# Patient Record
(demographics unavailable — no encounter records)

---

## 2025-05-05 NOTE — PHYSICAL EXAM
[No Acute Distress] : no acute distress [Normal Oropharynx] : normal oropharynx [Normal Appearance] : normal appearance [No Neck Mass] : no neck mass [Normal Rate/Rhythm] : normal rate/rhythm [Murmur ___ / 6] : murmur [unfilled] / 6 [Normal S1, S2] : normal s1, s2 [No Resp Distress] : no resp distress [No Acc Muscle Use] : no acc muscle use [Normal Palpation] : normal palpation [Normal Rhythm and Effort] : normal rhythm and effort [Clear to Auscultation Bilaterally] : clear to auscultation bilaterally [Normal to Percussion] : normal to percussion [No Abnormalities] : no abnormalities [Benign] : benign [Normal Gait] : normal gait [No Clubbing] : no clubbing [No Cyanosis] : no cyanosis [No Edema] : no edema [FROM] : FROM [Normal Color/ Pigmentation] : normal color/ pigmentation [No Focal Deficits] : no focal deficits [Oriented x3] : oriented x3 [Normal Affect] : normal affect

## 2025-05-05 NOTE — CONSULT LETTER
[Dear  ___] : Dear  [unfilled], [Consult Letter:] : I had the pleasure of evaluating your patient, [unfilled]. [Please see my note below.] : Please see my note below. [Consult Closing:] : Thank you very much for allowing me to participate in the care of this patient.  If you have any questions, please do not hesitate to contact me. [Sincerely,] : Sincerely, [FreeTextEntry3] : Arnulfo Cooper D.O., SHANIQUE.  of Medicine [facility]

## 2025-05-05 NOTE — DISCUSSION/SUMMARY
[FreeTextEntry1] : 1.5 cm groundglass opacity right upper lobe High clinical suspicion for adenocarcinoma spectrum disease adenocarcinoma in situ/versus adenocarcinoma minimally invasive High risk patient former smoker Emphysema noted on imaging Pulmonary physiology demonstrates severe gas exchange impairment consistent with the diagnosis of the emphysema Past medical history reported Mitral valve prolapse Hypertension Hyperlipidemia Anxiety  Recommendation I had a long discussion with both patient and  at today's visit Based on her age risk factor profile abnormal pulmonary physiology she is an extremely high risk patient for a surgical procedure including a VATS resection Based on the fact we do not have any indication or timeframe regarding when this lesion may have first been present although she did have a relatively unremarkable CT chest dated back to 2014 I recommended continued observation Will repeat a CT chest in 3 months Then may consideration if there is change for a PET scan Patient will undergo serial observation over time Discussed in detail pathophysiology of groundglass opacities without a solid component

## 2025-05-05 NOTE — END OF VISIT
[facility] [place] [facility] [address] [place] [address]  [phone]      Sumi Roque :1992 MRN:[id number]    2021 Time Session Began: 11:01  Time Session Ended: 11:47    Due to COVID-19 precautions, this visit was performed via live interactive two-way video with patient's verbal consent.   Clinician Location:Home.  Patient Location: Car (parked).  Verified patient identity:  [x] Yes    Session Type:45 Minute Therapy (81412)    Others Present: n/a    Intervention: Behavioral, Cognitive, Insight, Supportive, Systemic    Suicide/Homicide/Violence Ideation: No    If Yes, explain: n/a    Current Outpatient Medications   Medication Sig   • diclofenac (VOLTAREN) 1 % gel APPLY 2 GRAMS TOPICALLY THREE TIMES DAILY   • clonazePAM (KlonoPIN) 0.5 MG tablet Take 0.5 tablets by mouth 3 times daily. TO BE TAKEN CONSISTENTLY   • mirtazapine (REMERON) 7.5 MG tablet 1 to 1.5 tabs at bedtime   • propranolol (INDERAL) 20 MG tablet Take 1 tablet by mouth 3 times daily.   • famotidine (Pepcid) 20 MG tablet Take 1 tablet by mouth daily.   • Magnesium 400 MG Tab Take 400 mg by mouth 2 times daily. Takes 2 tablets   • vitamin D, Cholecalciferol, 25 mcg (1,000 units) capsule Take 25 mcg by mouth nightly.   • Omega-3 Fatty Acids (OMEGA-3 FISH OIL PO) Take 500 mg by mouth daily.   • acetaminophen (TYLENOL) 500 MG tablet Take 1,000 mg by mouth every 6 hours as needed for Pain.   • levonorgestrel-ethinyl estradiol (SEASONALE) 0.15-0.03 MG per tablet TK 1 T PO  D     No current facility-administered medications for this visit.       Change in Medication(s) Reported: Yes  If Yes, explain: see chart    Patient/Family Education Provided: Yes  Patient/Family Displays Understanding: Yes    If No, explain: n/a    Chief complaint in patient's own words: \"Things have been worse.\"    Progress Note containing chief complaint and symptoms/problems related to the  complaint:    (Data/Action/Response/Plan)    D: Patient discussed issues and events since last appointment. Reports on multiple difficulties with medication, side effects, and upcoming medical evaluations. Processed physical symptoms, heightened anxiety, and struggles to cope. Patient discussed work-related issues and stressors and perspective on current job. Processed communication difficulties with others, struggles to set limits, and feeling overwhelmed by recent circumstances. Discussed family issues, relationship dynamics, and limited support system.  A: Therapist offered empathic listening for patient concerns and experiences. Patient cognitions were acknowledged and reflected to encourage insight. Explored anxiety triggers, stress management, and perspective. Processed strategies for reshaping negative cognitions, exploring opportunities, and seeking potential in situations Reflected and reframed patient concerns.  Encouraged self-care and efforts toward daily relaxation.   R: Patient was open and talkative, and was engaged in the therapeutic process. Patient was alert and oriented x4. Mood was anxious and affect congruent. Eye contact was maintained. Patient was calm and cooperative with this provider. Speech was normal in rate, volume, and tone. Motor activity was unremarkable. Recent and remote memory was intact. There was no evidence that patient was responding to internal stimuli. Thought process was future oriented and goal directed.  P: Follow up session scheduled for July 22, 2021 at 12:00.    Need for Community Resources Assessed: Yes    Resources Needed: No    If Yes, what resources: n/a    Primary Diagnosis: Generalized Anxiety Disorder  : N/A    Treatment Plan: Unchanged    Discharge Plan: N/A    Next Appointment: July 22, 2021 at 12:00  Amarilis Bledsoe LPC   [Time Spent: ___ minutes] : I have spent [unfilled] minutes of time on the encounter which excludes teaching and separately reported services.

## 2025-05-05 NOTE — HISTORY OF PRESENT ILLNESS
[>= 20 pack years] : >= 20 pack years [TextBox_4] : 89-year-old female Abnormal CT chest Groundglass opacity identified right upper lobe No reported solid component They reported this as suspicious because there was no evidence of an abnormality dating back to scan of 2014 Patient does have a greater than 20-year pack history tobacco She states she tested off tobacco sometime in the 1990s She has no other chemical toxic inhalational exposures No respiratory symptoms No cough No wheeze No significant shortness of breath Additional findings on the CT scan of the chest reported emphysema  Past medical history Mitral valve prolapse History hypertension History hyperlipidemia Both hypertension hyperlipidemia previously noted she been on medications she states currently she is not taking active medications for these diagnoses as noted below Anxiety and she is on 50 mg of Zoloft  Chart review Status post emergency department evaluation [facility] March 28, 2025 88-year-old female Complaint of chest pressure States in the review Emergency Department note occasional chest discomfort she described more as heartburn She felt it occurs taking Zoloft Some associated general weakness some nausea She has been working with psychiatry weaning off of her Zoloft Did not report abdominal pain Does have anxiety She complains of having some shortness of breath for 1 to 2 days without exertional dyspnea They reported no leg swelling Reportedly patient had a visit in the emergency department for chest pain in December facility similar symptomatology and is under the care of Dr. Macedo for cardiology with plans for a stress test  Medications Xanax 0.25 mg twice daily Trazodone 50 mg bedtime Generic Norvasc 5 mg daily Losartan 50 mg daily Lexapro 10 mg daily At bedtime  EKG March 28, 2025 Reported normal sinus rhythm Abnormal chest imaging Subsequently patient was placed on antibiotic  Data reviewed CT chest angiogram March 29, 2025 Negative pulmonary embolism Right upper lobe 1.5 cm groundglass nodule reportedly increased in size since the prior scan dating back to 2014 Felt more likely to be a primary lung neoplasm with no solid component  CT ANGIO CHEST PULM [facility] ORDERED BY: NESS PEDROZA PROCEDURE DATE: 03/29/2025 INTERPRETATION: INDICATION: Rule out pulmonary embolus TECHNIQUE: Helical acquisition of the chest after the administration of 50 mL of Omnipaque 350. Maximum intensity projection images were generated. COMPARISON: CT chest 12/15/2014. FINDINGS: HEART/VASCULATURE: Normal heart size. No pericardial effusion. Normal caliber aorta. LUNGS/AIRWAYS/PLEURA: Patent airways through the lobar bronchi. Linear atelectasis in the lower lobes. Increased size of a 1.5 cm groundglass nodule in the posterior segment of the right upper lobe (301-44) prior indistinct, approximately 0.5 cm. Emphysema. No pleural effusion. LYMPH NODES/MEDIASTINUM: No lymphadenopathy. UPPER ABDOMEN: Unremarkable. BONES/SOFT TISSUES: Left shoulder replacement. Right humeral head anchors. Scoliosis. Degenerative disc disease. IMPRESSION: No pulmonary embolus. Right upper lobe 1.5 cm groundglass nodule, increased since 2014, likely a primary lung neoplasm. No solid component.  CT CHEST WO CON PROCEDURE DATE: Dec 15 2014 INTERPRETATION: Clinical information: Thrombocytosis. CT scan of the chest was obtained without administration of intravenous contrast. No hilar and/or mediastinal adenopathy is noted. Heart is enlarged in size. Calcification of the aortic valve and the coronary arteries is noted. No pericardial effusion is noted. No endobronchial lesions are noted. A 0.3 cm nodule is noted in the left lower lobe. This has CT characteristics typical of intrapulmonary lymph node. No pleural effusions are noted. Below the diaphragm, visualized portions of the abdomen is unremarkable. Degenerative changes of the spine are noted. IMPRESSION: Essentially unremarkable CT scan of the chest.   XR CHEST PORTABLE URGENT 1V ORDERED BY: NESS PEDROZA PROCEDURE DATE: 03/28/2025 INTERPRETATION: EXAMINATION: XR CHEST URGENT CLINICAL INDICATION: Chest Pain TECHNIQUE: Single frontal, portable view of the chest was obtained. COMPARISON: Chest x-ray 12/4/2024. FINDINGS: Status post left glenohumeral arthroplasty. Right shoulder anchors. The heart is normal in size. Question trace bilateral pleural effusions. Scarring in the left lung base. There is no pneumothorax or pleural effusion. No acute bony abnormality. Scoliosis. IMPRESSION: Scarring left base.  XR CHEST PA LAT 2V ORDERED BY: VA BARNES PROCEDURE DATE: 12/04/2024 INTERPRETATION: EXAMINATION: XR CHEST PA AND LATERAL CLINICAL INDICATION: Chest Pain TECHNIQUE: 2 views; Frontal and lateral views of the chest were obtained. COMPARISON: Chest x-ray 11/17/2024. FINDINGS: The heart is normal in size. Left lower lobe linear atelectasis, similar to prior. No focal consolidation. There is no pneumothorax or pleural effusion. Status post left total shoulder arthroplasty. IMPRESSION: No acute pulmonary disease.  EXAM: US DPLX LWR EXT VEINS COMPL BI ORDERED BY: BERNABE MOLINA PROCEDURE DATE: 09/17/2024 INTERPRETATION: CLINICAL INFORMATION: Rule out DVT COMPARISON: None available. TECHNIQUE: Duplex sonography of the BILATERAL LOWER extremity veins with color and spectral Doppler, with and without compression. FINDINGS: RIGHT: Normal compressibility of the RIGHT common femoral, femoral and popliteal veins. Doppler examination shows normal spontaneous and phasic flow. No RIGHT calf vein thrombosis is detected. LEFT: Normal compressibility of the LEFT common femoral, femoral and popliteal veins. Doppler examination shows normal spontaneous and phasic flow. No LEFT calf vein thrombosis is detected. IMPRESSION: No evidence of deep venous thrombosis in either lower extremity.  Hospital laboratory data reviewed March 28, 2025 CBC WBC 10.02 hemoglobin 12.5 hematocrit 38.3 Platelet count 359,000 Serum electrolytes Serum potassium 3.4 Creatinine 0.83 Serum calcium 9.9 D-dimer March 28, 2025 246 Correlate with CT chest angiogram noted negative for PE  ECHO Sept 2024 CONCLUSIONS:  1. Left ventricular cavity is normal in size. Left ventricular wall thickness is normal. Left ventricular systolic function is normal with an ejection fraction visually estimated at 60 to 65 %. There are no regional wall motion abnormalities seen. 2. Normal right ventricular cavity size and normal right ventricular systolic function. 3. Structurally normal mitral valve with normal leaflet excursion. There is calcification of the mitral valve annulus. There is mild mitral regurgitation. 4. The aortic valve appears trileaflet with normal systolic excursion. There is calcification of the aortic valve leaflets. There is mild aortic regurgitation. 5. The left atrium is mildly dilated with an indexed volume of 40.15 ml/m [TextBox_11] : 1 [TextBox_29] : reports stopped tobacco use 1990's

## 2025-05-05 NOTE — REVIEW OF SYSTEMS
[Negative] : Endocrine [TextBox_30] : Per HPI I just want to make sure I am interpreting this correctly. The molecular testing is in fact covered under the patient plan and with prior authorization it is approved with the noted authorization number below The patient has no out-of-pocket expense for this testing. If that is correct I will reach out to the patient Aminata and I am sure he will be considered appropriate testingPer HPI [TextBox_44] : History hypertension, history hyperlipidemia, history of mitral valve prolapse

## 2025-05-05 NOTE — PROCEDURE
[FreeTextEntry1] : PFT May 5, 2025 Flow rates normal range Lung volumes normal No air trapping Diffusion 45% predicted with a loss of functioning alveolar capillary units Impression severe gas exchange impairment.  NIOX 16 normal range May 5, 2025 End-tidal CO2 30 no evidence of hypercarbia May 5, 2025  Pulmonary 6-minute walk exercise study May 5, 2025 Baseline O2 saturation 94% Luis Felipe desaturation to 89% with good recovery Patient does demonstrate some evidence of exercise-induced hypoxemia without indication for portable oxygen therapy   Imaging   CT ANGIO CHEST PULM [facility] ORDERED BY: NESS PEDROZA PROCEDURE DATE: 03/29/2025 INTERPRETATION: INDICATION: Rule out pulmonary embolus TECHNIQUE: Helical acquisition of the chest after the administration of 50 mL of Omnipaque 350. Maximum intensity projection images were generated. COMPARISON: CT chest 12/15/2014. FINDINGS: HEART/VASCULATURE: Normal heart size. No pericardial effusion. Normal caliber aorta. LUNGS/AIRWAYS/PLEURA: Patent airways through the lobar bronchi. Linear atelectasis in the lower lobes. Increased size of a 1.5 cm groundglass nodule in the posterior segment of the right upper lobe (301-44) prior indistinct, approximately 0.5 cm. Emphysema. No pleural effusion. LYMPH NODES/MEDIASTINUM: No lymphadenopathy. UPPER ABDOMEN: Unremarkable. BONES/SOFT TISSUES: Left shoulder replacement. Right humeral head anchors. Scoliosis. Degenerative disc disease. IMPRESSION: No pulmonary embolus. Right upper lobe 1.5 cm groundglass nodule, increased since 2014, likely a primary lung neoplasm. No solid component.  CT CHEST WO CON PROCEDURE DATE: Dec 15 2014 INTERPRETATION: Clinical information: Thrombocytosis. CT scan of the chest was obtained without administration of intravenous contrast. No hilar and/or mediastinal adenopathy is noted. Heart is enlarged in size. Calcification of the aortic valve and the coronary arteries is noted. No pericardial effusion is noted. No endobronchial lesions are noted. A 0.3 cm nodule is noted in the left lower lobe. This has CT characteristics typical of intrapulmonary lymph node. No pleural effusions are noted. Below the diaphragm, visualized portions of the abdomen is unremarkable. Degenerative changes of the spine are noted. IMPRESSION: Essentially unremarkable CT scan of the chest.   XR CHEST PORTABLE URGENT 1V ORDERED BY: NESS PEDROZA PROCEDURE DATE: 03/28/2025 INTERPRETATION: EXAMINATION: XR CHEST URGENT CLINICAL INDICATION: Chest Pain TECHNIQUE: Single frontal, portable view of the chest was obtained. COMPARISON: Chest x-ray 12/4/2024. FINDINGS: Status post left glenohumeral arthroplasty. Right shoulder anchors. The heart is normal in size. Question trace bilateral pleural effusions. Scarring in the left lung base. There is no pneumothorax or pleural effusion. No acute bony abnormality. Scoliosis. IMPRESSION: Scarring left base.  XR CHEST PA LAT 2V ORDERED BY: VA BARNES PROCEDURE DATE: 12/04/2024 INTERPRETATION: EXAMINATION: XR CHEST PA AND LATERAL CLINICAL INDICATION: Chest Pain TECHNIQUE: 2 views; Frontal and lateral views of the chest were obtained. COMPARISON: Chest x-ray 11/17/2024. FINDINGS: The heart is normal in size. Left lower lobe linear atelectasis, similar to prior. No focal consolidation. There is no pneumothorax or pleural effusion. Status post left total shoulder arthroplasty. IMPRESSION: No acute pulmonary disease.  EXAM: US DPLX LWR EXT VEINS COMPL BI ORDERED BY: BERNABE MOLINA PROCEDURE DATE: 09/17/2024 INTERPRETATION: CLINICAL INFORMATION: Rule out DVT COMPARISON: None available. TECHNIQUE: Duplex sonography of the BILATERAL LOWER extremity veins with color and spectral Doppler, with and without compression. FINDINGS: RIGHT: Normal compressibility of the RIGHT common femoral, femoral and popliteal veins. Doppler examination shows normal spontaneous and phasic flow. No RIGHT calf vein thrombosis is detected. LEFT: Normal compressibility of the LEFT common femoral, femoral and popliteal veins. Doppler examination shows normal spontaneous and phasic flow. No LEFT calf vein thrombosis is detected. IMPRESSION: No evidence of deep venous thrombosis in either lower extremity.

## 2025-07-07 NOTE — REASON FOR VISIT
[Abnormal CXR/ Chest CT] : an abnormal CXR/ chest CT [Follow-Up] : a follow-up visit [TextBox_44] : MASSIMO

## 2025-07-07 NOTE — CONSULT LETTER
[Dear  ___] : Dear  [unfilled], [Consult Letter:] : I had the pleasure of evaluating your patient, [unfilled]. [Please see my note below.] : Please see my note below. [Consult Closing:] : Thank you very much for allowing me to participate in the care of this patient.  If you have any questions, please do not hesitate to contact me. [Sincerely,] : Sincerely, [FreeTextEntry3] : Arnulfo Cooper D.O., SHANIQUE.  of Medicine [facility]

## 2025-07-07 NOTE — DISCUSSION/SUMMARY
[FreeTextEntry1] : 1.5 cm groundglass opacity right upper lobe f/u CT CHEST and can consider PET CT  trend imaging for subsolid component High clinical suspicion for adenocarcinoma spectrum disease adenocarcinoma in situ/versus adenocarcinoma minimally invasive High risk patient former smoker Emphysema noted on imaging Pulmonary physiology demonstrates severe gas exchange impairment consistent with the diagnosis of the emphysema Past medical history reported Mitral valve prolapse Hypertension Hyperlipidemia Anxiety  Recommendation I had a long discussion with both patient and  at today's visit Based on her age risk factor profile abnormal pulmonary physiology she is an extremely high risk patient for a surgical procedure including a VATS resection Based on the fact we do not have any indication or timeframe regarding when this lesion may have first been present although she did have a relatively unremarkable CT chest dated back to 2014 I recommended continued observation Will repeat a CT chest in 3 months Then may consideration if there is change for a PET scan Patient will undergo serial observation over time Discussed in detail pathophysiology of groundglass opacities without a solid component

## 2025-07-07 NOTE — HISTORY OF PRESENT ILLNESS
[>= 20 pack years] : >= 20 pack years [TextBox_4] : 89-year-old female Abnormal CT chest Groundglass opacity identified right upper lobe No reported solid component They reported this as suspicious because there was no evidence of an abnormality dating back to scan of 2014 Patient does have a greater than 20-year pack history tobacco She states she tested off tobacco sometime in the 1990s She has no other chemical toxic inhalational exposures No respiratory symptoms No cough No wheeze No significant shortness of breath Additional findings on the CT scan of the chest reported emphysema  Past medical history Mitral valve prolapse History hypertension History hyperlipidemia Both hypertension hyperlipidemia previously noted she been on medications she states currently she is not taking active medications for these diagnoses as noted below Anxiety and she is on 50 mg of Zoloft  Chart review Status post emergency department evaluation [facility] March 28, 2025 88-year-old female Complaint of chest pressure States in the review Emergency Department note occasional chest discomfort she described more as heartburn She felt it occurs taking Zoloft Some associated general weakness some nausea She has been working with psychiatry weaning off of her Zoloft Did not report abdominal pain Does have anxiety She complains of having some shortness of breath for 1 to 2 days without exertional dyspnea They reported no leg swelling Reportedly patient had a visit in the emergency department for chest pain in December facility similar symptomatology and is under the care of Dr. Macedo for cardiology with plans for a stress test  Medications Xanax 0.25 mg twice daily Trazodone 50 mg bedtime Generic Norvasc 5 mg daily Losartan 50 mg daily Lexapro 10 mg daily At bedtime  EKG March 28, 2025 Reported normal sinus rhythm Abnormal chest imaging Subsequently patient was placed on antibiotic  Data reviewed CT chest angiogram March 29, 2025 Negative pulmonary embolism Right upper lobe 1.5 cm groundglass nodule reportedly increased in size since the prior scan dating back to 2014 Felt more likely to be a primary lung neoplasm with no solid component  CT ANGIO CHEST PULM ART WAWIC ORDERED BY: NESS PEDROZA PROCEDURE DATE: 03/29/2025 INTERPRETATION: INDICATION: Rule out pulmonary embolus TECHNIQUE: Helical acquisition of the chest after the administration of 50 mL of Omnipaque 350. Maximum intensity projection images were generated. COMPARISON: CT chest 12/15/2014. FINDINGS: HEART/VASCULATURE: Normal heart size. No pericardial effusion. Normal caliber aorta. LUNGS/AIRWAYS/PLEURA: Patent airways through the lobar bronchi. Linear atelectasis in the lower lobes. Increased size of a 1.5 cm groundglass nodule in the posterior segment of the right upper lobe (301-44) prior indistinct, approximately 0.5 cm. Emphysema. No pleural effusion. LYMPH NODES/MEDIASTINUM: No lymphadenopathy. UPPER ABDOMEN: Unremarkable. BONES/SOFT TISSUES: Left shoulder replacement. Right humeral head anchors. Scoliosis. Degenerative disc disease. IMPRESSION: No pulmonary embolus. Right upper lobe 1.5 cm groundglass nodule, increased since 2014, likely a primary lung neoplasm. No solid component.  CT CHEST WO CON PROCEDURE DATE: Dec 15 2014 INTERPRETATION: Clinical information: Thrombocytosis. CT scan of the chest was obtained without administration of intravenous contrast. No hilar and/or mediastinal adenopathy is noted. Heart is enlarged in size. Calcification of the aortic valve and the coronary arteries is noted. No pericardial effusion is noted. No endobronchial lesions are noted. A 0.3 cm nodule is noted in the left lower lobe. This has CT characteristics typical of intrapulmonary lymph node. No pleural effusions are noted. Below the diaphragm, visualized portions of the abdomen is unremarkable. Degenerative changes of the spine are noted. IMPRESSION: Essentially unremarkable CT scan of the chest.   XR CHEST PORTABLE URGENT 1V ORDERED BY: NESS PEDROZA PROCEDURE DATE: 03/28/2025 INTERPRETATION: EXAMINATION: XR CHEST URGENT CLINICAL INDICATION: Chest Pain TECHNIQUE: Single frontal, portable view of the chest was obtained. COMPARISON: Chest x-ray 12/4/2024. FINDINGS: Status post left glenohumeral arthroplasty. Right shoulder anchors. The heart is normal in size. Question trace bilateral pleural effusions. Scarring in the left lung base. There is no pneumothorax or pleural effusion. No acute bony abnormality. Scoliosis. IMPRESSION: Scarring left base.  XR CHEST PA LAT 2V ORDERED BY: VA BARNES PROCEDURE DATE: 12/04/2024 INTERPRETATION: EXAMINATION: XR CHEST PA AND LATERAL CLINICAL INDICATION: Chest Pain TECHNIQUE: 2 views; Frontal and lateral views of the chest were obtained. COMPARISON: Chest x-ray 11/17/2024. FINDINGS: The heart is normal in size. Left lower lobe linear atelectasis, similar to prior. No focal consolidation. There is no pneumothorax or pleural effusion. Status post left total shoulder arthroplasty. IMPRESSION: No acute pulmonary disease.  EXAM: US DPLX LWR EXT VEINS COMPL BI ORDERED BY: BERNABE MOLINA PROCEDURE DATE: 09/17/2024 INTERPRETATION: CLINICAL INFORMATION: Rule out DVT COMPARISON: None available. TECHNIQUE: Duplex sonography of the BILATERAL LOWER extremity veins with color and spectral Doppler, with and without compression. FINDINGS: RIGHT: Normal compressibility of the RIGHT common femoral, femoral and popliteal veins. Doppler examination shows normal spontaneous and phasic flow. No RIGHT calf vein thrombosis is detected. LEFT: Normal compressibility of the LEFT common femoral, femoral and popliteal veins. Doppler examination shows normal spontaneous and phasic flow. No LEFT calf vein thrombosis is detected. IMPRESSION: No evidence of deep venous thrombosis in either lower extremity.  Hospital laboratory data reviewed March 28, 2025 CBC WBC 10.02 hemoglobin 12.5 hematocrit 38.3 Platelet count 359,000 Serum electrolytes Serum potassium 3.4 Creatinine 0.83 Serum calcium 9.9 D-dimer March 28, 2025 246 Correlate with CT chest angiogram noted negative for PE  ECHO Sept 2024 CONCLUSIONS:  1. Left ventricular cavity is normal in size. Left ventricular wall thickness is normal. Left ventricular systolic function is normal with an ejection fraction visually estimated at 60 to 65 %. There are no regional wall motion abnormalities seen. 2. Normal right ventricular cavity size and normal right ventricular systolic function. 3. Structurally normal mitral valve with normal leaflet excursion. There is calcification of the mitral valve annulus. There is mild mitral regurgitation. 4. The aortic valve appears trileaflet with normal systolic excursion. There is calcification of the aortic valve leaflets. There is mild aortic regurgitation. 5. The left atrium is mildly dilated with an indexed volume of 40.15 ml/m [TextBox_11] : 1 [TextBox_29] : reports stopped tobacco use 1990's

## 2025-07-07 NOTE — PROCEDURE
[FreeTextEntry1] : Chest x-ray July 7, 2025 Cardiac silhouette grossly normal Left lower lobe atelectatic change thickened linear No appreciable dominant pulmonary nodules parenchymal infiltrates pleural effusions Scoliosis Hardware left upper extremity consistent with prior surgery Correlate CT chest  MIKHAIL 7/7/25  Well preserved flow rates  Mild OAD stable flow  rates   PFT May 5, 2025 Flow rates normal range Lung volumes normal No air trapping Diffusion 45% predicted with a loss of functioning alveolar capillary units Impression severe gas exchange impairment.  NIOX 16 normal range May 5, 2025 End-tidal CO2 30 no evidence of hypercarbia May 5, 2025  Pulmonary 6-minute walk exercise study May 5, 2025 Baseline O2 saturation 94% Luis Felipe desaturation to 89% with good recovery Patient does demonstrate some evidence of exercise-induced hypoxemia without indication for portable oxygen therapy   Imaging   CT ANGIO CHEST PULM ART WAWIC ORDERED BY: NESS PEDROZA PROCEDURE DATE: 03/29/2025 INTERPRETATION: INDICATION: Rule out pulmonary embolus TECHNIQUE: Helical acquisition of the chest after the administration of 50 mL of Omnipaque 350. Maximum intensity projection images were generated. COMPARISON: CT chest 12/15/2014. FINDINGS: HEART/VASCULATURE: Normal heart size. No pericardial effusion. Normal caliber aorta. LUNGS/AIRWAYS/PLEURA: Patent airways through the lobar bronchi. Linear atelectasis in the lower lobes. Increased size of a 1.5 cm groundglass nodule in the posterior segment of the right upper lobe (301-44) prior indistinct, approximately 0.5 cm. Emphysema. No pleural effusion. LYMPH NODES/MEDIASTINUM: No lymphadenopathy. UPPER ABDOMEN: Unremarkable. BONES/SOFT TISSUES: Left shoulder replacement. Right humeral head anchors. Scoliosis. Degenerative disc disease. IMPRESSION: No pulmonary embolus. Right upper lobe 1.5 cm groundglass nodule, increased since 2014, likely a primary lung neoplasm. No solid component.  CT CHEST WO CON PROCEDURE DATE: Dec 15 2014 INTERPRETATION: Clinical information: Thrombocytosis. CT scan of the chest was obtained without administration of intravenous contrast. No hilar and/or mediastinal adenopathy is noted. Heart is enlarged in size. Calcification of the aortic valve and the coronary arteries is noted. No pericardial effusion is noted. No endobronchial lesions are noted. A 0.3 cm nodule is noted in the left lower lobe. This has CT characteristics typical of intrapulmonary lymph node. No pleural effusions are noted. Below the diaphragm, visualized portions of the abdomen is unremarkable. Degenerative changes of the spine are noted. IMPRESSION: Essentially unremarkable CT scan of the chest.   XR CHEST PORTABLE URGENT 1V ORDERED BY: NESS PEDROZA PROCEDURE DATE: 03/28/2025 INTERPRETATION: EXAMINATION: XR CHEST URGENT CLINICAL INDICATION: Chest Pain TECHNIQUE: Single frontal, portable view of the chest was obtained. COMPARISON: Chest x-ray 12/4/2024. FINDINGS: Status post left glenohumeral arthroplasty. Right shoulder anchors. The heart is normal in size. Question trace bilateral pleural effusions. Scarring in the left lung base. There is no pneumothorax or pleural effusion. No acute bony abnormality. Scoliosis. IMPRESSION: Scarring left base.  XR CHEST PA LAT 2V ORDERED BY: VA BARNES PROCEDURE DATE: 12/04/2024 INTERPRETATION: EXAMINATION: XR CHEST PA AND LATERAL CLINICAL INDICATION: Chest Pain TECHNIQUE: 2 views; Frontal and lateral views of the chest were obtained. COMPARISON: Chest x-ray 11/17/2024. FINDINGS: The heart is normal in size. Left lower lobe linear atelectasis, similar to prior. No focal consolidation. There is no pneumothorax or pleural effusion. Status post left total shoulder arthroplasty. IMPRESSION: No acute pulmonary disease.  EXAM: US DPLX LWR EXT VEINS COMPL BI ORDERED BY: BERNABE MOLINA PROCEDURE DATE: 09/17/2024 INTERPRETATION: CLINICAL INFORMATION: Rule out DVT COMPARISON: None available. TECHNIQUE: Duplex sonography of the BILATERAL LOWER extremity veins with color and spectral Doppler, with and without compression. FINDINGS: RIGHT: Normal compressibility of the RIGHT common femoral, femoral and popliteal veins. Doppler examination shows normal spontaneous and phasic flow. No RIGHT calf vein thrombosis is detected. LEFT: Normal compressibility of the LEFT common femoral, femoral and popliteal veins. Doppler examination shows normal spontaneous and phasic flow. No LEFT calf vein thrombosis is detected. IMPRESSION: No evidence of deep venous thrombosis in either lower extremity.